# Patient Record
Sex: FEMALE | URBAN - METROPOLITAN AREA
[De-identification: names, ages, dates, MRNs, and addresses within clinical notes are randomized per-mention and may not be internally consistent; named-entity substitution may affect disease eponyms.]

---

## 2018-03-28 ENCOUNTER — TELEPHONE (OUTPATIENT)
Dept: NEUROLOGY | Facility: HOSPITAL | Age: 83
End: 2018-03-28

## 2018-03-28 NOTE — TELEPHONE ENCOUNTER
New referral from  [facility].  Left message for patient to return with her .  Sent email to pt as well.